# Patient Record
Sex: MALE | Race: BLACK OR AFRICAN AMERICAN | NOT HISPANIC OR LATINO | Employment: STUDENT | URBAN - METROPOLITAN AREA
[De-identification: names, ages, dates, MRNs, and addresses within clinical notes are randomized per-mention and may not be internally consistent; named-entity substitution may affect disease eponyms.]

---

## 2024-05-18 ENCOUNTER — HOSPITAL ENCOUNTER (EMERGENCY)
Facility: HOSPITAL | Age: 19
Discharge: HOME | End: 2024-05-18
Attending: STUDENT IN AN ORGANIZED HEALTH CARE EDUCATION/TRAINING PROGRAM
Payer: COMMERCIAL

## 2024-05-18 VITALS
DIASTOLIC BLOOD PRESSURE: 87 MMHG | OXYGEN SATURATION: 100 % | RESPIRATION RATE: 16 BRPM | SYSTOLIC BLOOD PRESSURE: 128 MMHG | WEIGHT: 134 LBS | TEMPERATURE: 100.2 F | HEART RATE: 95 BPM | BODY MASS INDEX: 20.31 KG/M2 | HEIGHT: 68 IN

## 2024-05-18 DIAGNOSIS — J36 PERITONSILLAR ABSCESS: Primary | ICD-10-CM

## 2024-05-18 PROCEDURE — 2500000004 HC RX 250 GENERAL PHARMACY W/ HCPCS (ALT 636 FOR OP/ED)

## 2024-05-18 PROCEDURE — 42700 I&D ABSCESS PERITONSILLAR: CPT

## 2024-05-18 PROCEDURE — 99284 EMERGENCY DEPT VISIT MOD MDM: CPT | Performed by: STUDENT IN AN ORGANIZED HEALTH CARE EDUCATION/TRAINING PROGRAM

## 2024-05-18 PROCEDURE — 99283 EMERGENCY DEPT VISIT LOW MDM: CPT | Mod: 25

## 2024-05-18 PROCEDURE — 42700 I&D ABSCESS PERITONSILLAR: CPT | Performed by: STUDENT IN AN ORGANIZED HEALTH CARE EDUCATION/TRAINING PROGRAM

## 2024-05-18 PROCEDURE — 2500000001 HC RX 250 WO HCPCS SELF ADMINISTERED DRUGS (ALT 637 FOR MEDICARE OP)

## 2024-05-18 RX ORDER — LIDOCAINE HYDROCHLORIDE 20 MG/ML
1.25 SOLUTION OROPHARYNGEAL ONCE
Status: COMPLETED | OUTPATIENT
Start: 2024-05-18 | End: 2024-05-18

## 2024-05-18 RX ORDER — ACETAMINOPHEN 325 MG/1
650 TABLET ORAL EVERY 6 HOURS PRN
Qty: 30 TABLET | Refills: 0 | Status: SHIPPED | OUTPATIENT
Start: 2024-05-18

## 2024-05-18 RX ORDER — AMOXICILLIN AND CLAVULANATE POTASSIUM 875; 125 MG/1; MG/1
1 TABLET, FILM COATED ORAL EVERY 12 HOURS
Qty: 20 TABLET | Refills: 0 | Status: SHIPPED | OUTPATIENT
Start: 2024-05-18 | End: 2024-05-28

## 2024-05-18 RX ORDER — AMOXICILLIN AND CLAVULANATE POTASSIUM 875; 125 MG/1; MG/1
1 TABLET, FILM COATED ORAL ONCE
Status: COMPLETED | OUTPATIENT
Start: 2024-05-18 | End: 2024-05-18

## 2024-05-18 RX ADMIN — LIDOCAINE HYDROCHLORIDE 1.25 ML: 20 SOLUTION ORAL at 01:03

## 2024-05-18 RX ADMIN — AMOXICILLIN AND CLAVULANATE POTASSIUM 1 TABLET: 875; 125 TABLET, FILM COATED ORAL at 01:43

## 2024-05-18 RX ADMIN — DEXAMETHASONE 10 MG: 6 TABLET ORAL at 01:43

## 2024-05-18 ASSESSMENT — PAIN - FUNCTIONAL ASSESSMENT: PAIN_FUNCTIONAL_ASSESSMENT: 0-10

## 2024-05-18 ASSESSMENT — PAIN SCALES - GENERAL: PAINLEVEL_OUTOF10: 8

## 2024-05-18 ASSESSMENT — LIFESTYLE VARIABLES
HAVE YOU EVER FELT YOU SHOULD CUT DOWN ON YOUR DRINKING: NO
TOTAL SCORE: 0
HAVE PEOPLE ANNOYED YOU BY CRITICIZING YOUR DRINKING: NO
EVER FELT BAD OR GUILTY ABOUT YOUR DRINKING: NO
EVER HAD A DRINK FIRST THING IN THE MORNING TO STEADY YOUR NERVES TO GET RID OF A HANGOVER: NO

## 2024-05-18 ASSESSMENT — PAIN DESCRIPTION - LOCATION: LOCATION: THROAT

## 2024-05-18 NOTE — Clinical Note
Teddy Bimal accompanied Osbaldo Wallis to the emergency department on 5/18/2024. They may return to work on 05/19/2024.      If you have any questions or concerns, please don't hesitate to call.      Adriana Vera MD

## 2024-05-18 NOTE — ED PROVIDER NOTES
CC: Sore Throat     HPI:  Osbaldo Wallis is a 18 y.o. male who presents with 3 days of sore throat and throat swelling.  He reports fevers/chills at home, and worsening pain and swelling of the left side of his throat for the last 3 days.  This evening, he became unable to swallow, so presented to the emergency department for evaluation.    He is accompanied to the emergency department by his mother, who reports that his voice sounds different from normal.    Limitations to History: none  Additional History provided by:  Mother    External Records Reviewed:  Recent available ED and inpatient notes reviewed in EMR.    PMHx/PSHx:  Per HPI.   - has no past medical history on file.  - has no past surgical history on file.    Medications:  Reviewed in EMR. See EMR for complete list of medications and doses.    Allergies:  Patient has no known allergies.    Social History:  - Tobacco:  has no history on file for tobacco use.   - Alcohol:  has no history on file for alcohol use.   - Illicit Drugs:  has no history on file for drug use.     ROS:  Per HPI.     ???????????????????????????????????????????????????????????????  Triage Vitals:  T 37.9 °C (100.2 °F)  HR 95  /87  RR 16  O2 100 % None (Room air)    Physical exam:   General: Vitals reviewed, afebrile. Well-appearing, in no acute distress.   Head: Normocephalic, atraumatic  EENT: PERRL, EOMI. Hearing grossly intact.   Mouth: Muffled voice. MMM. Significant swelling of L tonsillar pillar with uvular deviation. Frequent spitting of saliva.  Neck: ROM intact. L submandibular gland tenderness.   Cardiac: Normal rate, regular rhythm. Normal S1 and S2.  No murmurs, gallops, rubs.   Pulmonary: Good air exchange. Lungs clear bilaterally. No wheezes, rhonchi, or rales. No accessory muscle use.   Abdomen: Soft, nontender. No guarding or rebound.  Extremities: No peripheral edema.  Full range of motion. Moves all extremities freely.  Skin: Warm and dry, no  rashes.  Neuro: Face symmetric, speech muffled. No gross neurologic deficits. Sensation equal bilaterally. No weakness.     ???????????????????????????????????????????????????????????????  ED Course:  Diagnoses as of 05/18/24 0346   Peritonsillar abscess       EKG & Images:  Independently reviewed, See ED Course      MDM:  Osbaldo Wallis is a 18 y.o. male with no significant past medical history who presented to ED with sore throat and intraoral swelling. On arrival to ED, patient had a low-grade fever to 100.2F, was otherwise hemodynamically stable and saturating well on room air. Exam was significant for approximately 4 x 3 cm abscess of left tonsil causing uvular deviation, and a tender L submandibular gland.  Voice muffled, however airway intact, and patient was saturating 100% on room air.     Initial differential included strep throat, viral pharyngitis, tonsillitis, and allergic reaction however the patient's exam was most consistent with a peritonsillar abscess. Topical anesthesia provided with lidocaine solution and PTA was drained with single stab incision, with immediate output of moderate amount of bloody and purulent drainage.  Subsequently patient was able to tolerate PO fluids. He was given 10 mg Decadron and first dose of Augmentin in the emergency department. I called the ENT referral line, and left a message with the patient's contact information and MRN; ENT office will call the patient within 24 hours to schedule follow-up appointment.    Discharged with prescriptions for a 10-day course of Augmentin BID and tylenol PRN. Discussed strict return precautions including fevers/chills, worsening pain, increased swelling, muffled voice, inability to swallow, or difficulty breathing. At time of discharge, patient was hemodynamically stable and tolerating PO fluids.    Final diagnoses:   [J36] Peritonsillar abscess       Patient seen and staffed with Dr. Cook.    Social Determinants Limiting  Care:  None identified    Disposition:  Discharge    Adriana Vera MD   Emergency Medicine PGY1  [facility]     Disclaimer: This note was dictated by speech recognition. Minor errors in transcription may be present    Incision and Drainage    Performed by: Adriana Vera MD  Authorized by: Leidy Cook MD    Consent:     Consent obtained:  Verbal    Consent given by:  Patient    Risks, benefits, and alternatives were discussed: yes      Risks discussed:  Bleeding, incomplete drainage and pain  Universal protocol:     Procedure explained and questions answered to patient or proxy's satisfaction: yes      Patient identity confirmed:  Verbally with patient  Location:     Type:  Abscess    Size:  ~4x3 cm    Location:  Mouth    Mouth location:  Peritonsillar  Sedation:     Sedation type:  None  Anesthesia:     Anesthesia method:  Topical application    Topical anesthesia: viscous lidocaine.  Procedure type:     Complexity:  Simple  Procedure details:     Ultrasound guidance: no      Needle aspiration: no      Incision types:  Stab incision    Incision depth:  Submucosal    Drainage:  Bloody and purulent    Drainage amount:  Moderate    Wound treatment:  Wound left open    Packing materials:  None  Post-procedure details:     Procedure completion:  Tolerated   ? [employer] last updated 5/18/2024 3:46 AM        Adriana Vera MD  Resident  05/18/24 0400

## 2024-05-18 NOTE — ED TRIAGE NOTES
Pt presents to ED for sore throat x3 days. Pt states it is difficult to talk and hurts to swallow. Pt denies any other medical complaints. Pt denies difficulty breathing/oral swelling.

## 2024-05-18 NOTE — DISCHARGE INSTRUCTIONS
You were seen in the emergency department today for a peritonsillar abscess, which is likely caused by a bacterial infection. A prescription for augmentin, an antibiotic, has been sent to your pharmacy. Please use this medication as instructed by your pharmacist. At home, you can take tylenol or motrin for pain. A referral was called in to the [facility] (ENT), they will call you in the next 24 hours to set up an appointment. Please return to the emergency department if you begin experiencing any worsening pain or swelling, difficulty breathing, or inability to swallow.